# Patient Record
Sex: MALE | Race: OTHER | URBAN - METROPOLITAN AREA
[De-identification: names, ages, dates, MRNs, and addresses within clinical notes are randomized per-mention and may not be internally consistent; named-entity substitution may affect disease eponyms.]

---

## 2017-06-20 ENCOUNTER — EMERGENCY (EMERGENCY)
Facility: HOSPITAL | Age: 82
LOS: 1 days | Discharge: HOME | End: 2017-06-20
Attending: EMERGENCY MEDICINE | Admitting: EMERGENCY MEDICINE
Payer: MEDICARE

## 2017-06-20 VITALS
TEMPERATURE: 98 F | RESPIRATION RATE: 18 BRPM | DIASTOLIC BLOOD PRESSURE: 71 MMHG | HEART RATE: 90 BPM | OXYGEN SATURATION: 98 % | SYSTOLIC BLOOD PRESSURE: 127 MMHG

## 2017-06-20 DIAGNOSIS — K12.2 CELLULITIS AND ABSCESS OF MOUTH: ICD-10-CM

## 2017-06-20 LAB
ALBUMIN SERPL ELPH-MCNC: 4.1 G/DL — SIGNIFICANT CHANGE UP (ref 3.3–5)
ALP SERPL-CCNC: 73 U/L — SIGNIFICANT CHANGE UP (ref 40–120)
ALT FLD-CCNC: 13 U/L RC — SIGNIFICANT CHANGE UP (ref 10–45)
ANION GAP SERPL CALC-SCNC: 17 MMOL/L — SIGNIFICANT CHANGE UP (ref 5–17)
ANION GAP SERPL CALC-SCNC: 19 MMOL/L — HIGH (ref 5–17)
AST SERPL-CCNC: 27 U/L — SIGNIFICANT CHANGE UP (ref 10–40)
BASE EXCESS BLDV CALC-SCNC: -1 MMOL/L — SIGNIFICANT CHANGE UP (ref -2–2)
BASE EXCESS BLDV CALC-SCNC: -1.1 MMOL/L — SIGNIFICANT CHANGE UP (ref -2–2)
BASOPHILS # BLD AUTO: 0 K/UL — SIGNIFICANT CHANGE UP (ref 0–0.2)
BASOPHILS NFR BLD AUTO: 0.3 % — SIGNIFICANT CHANGE UP (ref 0–2)
BILIRUB SERPL-MCNC: 0.8 MG/DL — SIGNIFICANT CHANGE UP (ref 0.2–1.2)
BUN SERPL-MCNC: 26 MG/DL — HIGH (ref 7–23)
BUN SERPL-MCNC: 27 MG/DL — HIGH (ref 7–23)
CA-I SERPL-SCNC: 1.16 MMOL/L — SIGNIFICANT CHANGE UP (ref 1.12–1.3)
CA-I SERPL-SCNC: 1.27 MMOL/L — SIGNIFICANT CHANGE UP (ref 1.12–1.3)
CALCIUM SERPL-MCNC: 10 MG/DL — SIGNIFICANT CHANGE UP (ref 8.4–10.5)
CALCIUM SERPL-MCNC: 10.1 MG/DL — SIGNIFICANT CHANGE UP (ref 8.4–10.5)
CHLORIDE BLDV-SCNC: 110 MMOL/L — HIGH (ref 96–108)
CHLORIDE BLDV-SCNC: 111 MMOL/L — HIGH (ref 96–108)
CHLORIDE SERPL-SCNC: 102 MMOL/L — SIGNIFICANT CHANGE UP (ref 96–108)
CHLORIDE SERPL-SCNC: 102 MMOL/L — SIGNIFICANT CHANGE UP (ref 96–108)
CO2 BLDV-SCNC: 24 MMOL/L — SIGNIFICANT CHANGE UP (ref 22–30)
CO2 BLDV-SCNC: 25 MMOL/L — SIGNIFICANT CHANGE UP (ref 22–30)
CO2 SERPL-SCNC: 19 MMOL/L — LOW (ref 22–31)
CO2 SERPL-SCNC: 21 MMOL/L — LOW (ref 22–31)
CREAT SERPL-MCNC: 1.22 MG/DL — SIGNIFICANT CHANGE UP (ref 0.5–1.3)
CREAT SERPL-MCNC: 1.27 MG/DL — SIGNIFICANT CHANGE UP (ref 0.5–1.3)
EOSINOPHIL # BLD AUTO: 0 K/UL — SIGNIFICANT CHANGE UP (ref 0–0.5)
EOSINOPHIL NFR BLD AUTO: 0.3 % — SIGNIFICANT CHANGE UP (ref 0–6)
GAS PNL BLDV: 132 MMOL/L — LOW (ref 136–145)
GAS PNL BLDV: 137 MMOL/L — SIGNIFICANT CHANGE UP (ref 136–145)
GAS PNL BLDV: SIGNIFICANT CHANGE UP
GAS PNL BLDV: SIGNIFICANT CHANGE UP
GLUCOSE BLDV-MCNC: 142 MG/DL — HIGH (ref 70–99)
GLUCOSE BLDV-MCNC: 146 MG/DL — HIGH (ref 70–99)
GLUCOSE SERPL-MCNC: 139 MG/DL — HIGH (ref 70–99)
GLUCOSE SERPL-MCNC: 150 MG/DL — HIGH (ref 70–99)
HCO3 BLDV-SCNC: 23 MMOL/L — SIGNIFICANT CHANGE UP (ref 21–29)
HCO3 BLDV-SCNC: 24 MMOL/L — SIGNIFICANT CHANGE UP (ref 21–29)
HCT VFR BLD CALC: 44.3 % — SIGNIFICANT CHANGE UP (ref 39–50)
HCT VFR BLDA CALC: 47 % — SIGNIFICANT CHANGE UP (ref 39–50)
HCT VFR BLDA CALC: 47 % — SIGNIFICANT CHANGE UP (ref 39–50)
HGB BLD CALC-MCNC: 15.2 G/DL — SIGNIFICANT CHANGE UP (ref 13–17)
HGB BLD CALC-MCNC: 15.3 G/DL — SIGNIFICANT CHANGE UP (ref 13–17)
HGB BLD-MCNC: 15.1 G/DL — SIGNIFICANT CHANGE UP (ref 13–17)
LACTATE BLDV-MCNC: 0.9 MMOL/L — SIGNIFICANT CHANGE UP (ref 0.7–2)
LACTATE BLDV-MCNC: 1.2 MMOL/L — SIGNIFICANT CHANGE UP (ref 0.7–2)
LYMPHOCYTES # BLD AUTO: 1.1 K/UL — SIGNIFICANT CHANGE UP (ref 1–3.3)
LYMPHOCYTES # BLD AUTO: 6.7 % — LOW (ref 13–44)
MCHC RBC-ENTMCNC: 33.5 PG — SIGNIFICANT CHANGE UP (ref 27–34)
MCHC RBC-ENTMCNC: 34.2 GM/DL — SIGNIFICANT CHANGE UP (ref 32–36)
MCV RBC AUTO: 97.9 FL — SIGNIFICANT CHANGE UP (ref 80–100)
MONOCYTES # BLD AUTO: 0.3 K/UL — SIGNIFICANT CHANGE UP (ref 0–0.9)
MONOCYTES NFR BLD AUTO: 2.1 % — SIGNIFICANT CHANGE UP (ref 2–14)
NEUTROPHILS # BLD AUTO: 14.9 K/UL — HIGH (ref 1.8–7.4)
NEUTROPHILS NFR BLD AUTO: 90.6 % — HIGH (ref 43–77)
OTHER CELLS CSF MANUAL: 19 ML/DL — SIGNIFICANT CHANGE UP (ref 18–22)
PCO2 BLDV: 39 MMHG — SIGNIFICANT CHANGE UP (ref 35–50)
PCO2 BLDV: 43 MMHG — SIGNIFICANT CHANGE UP (ref 35–50)
PH BLDV: 7.36 — SIGNIFICANT CHANGE UP (ref 7.35–7.45)
PH BLDV: 7.39 — SIGNIFICANT CHANGE UP (ref 7.35–7.45)
PLATELET # BLD AUTO: 184 K/UL — SIGNIFICANT CHANGE UP (ref 150–400)
PO2 BLDV: 52 MMHG — HIGH (ref 25–45)
PO2 BLDV: 70 MMHG — HIGH (ref 25–45)
POTASSIUM BLDV-SCNC: 4.5 MMOL/L — SIGNIFICANT CHANGE UP (ref 3.5–5)
POTASSIUM BLDV-SCNC: 9.5 MMOL/L — CRITICAL HIGH (ref 3.5–5)
POTASSIUM SERPL-MCNC: 4.7 MMOL/L — SIGNIFICANT CHANGE UP (ref 3.5–5.3)
POTASSIUM SERPL-MCNC: 5.9 MMOL/L — HIGH (ref 3.5–5.3)
POTASSIUM SERPL-SCNC: 4.7 MMOL/L — SIGNIFICANT CHANGE UP (ref 3.5–5.3)
POTASSIUM SERPL-SCNC: 5.9 MMOL/L — HIGH (ref 3.5–5.3)
PROT SERPL-MCNC: 8.3 G/DL — SIGNIFICANT CHANGE UP (ref 6–8.3)
RBC # BLD: 4.52 M/UL — SIGNIFICANT CHANGE UP (ref 4.2–5.8)
RBC # FLD: 11.8 % — SIGNIFICANT CHANGE UP (ref 10.3–14.5)
S PYO AG SPEC QL IA: NEGATIVE — SIGNIFICANT CHANGE UP
SAO2 % BLDV: 86 % — SIGNIFICANT CHANGE UP (ref 67–88)
SAO2 % BLDV: 94 % — HIGH (ref 67–88)
SODIUM SERPL-SCNC: 140 MMOL/L — SIGNIFICANT CHANGE UP (ref 135–145)
SODIUM SERPL-SCNC: 140 MMOL/L — SIGNIFICANT CHANGE UP (ref 135–145)
WBC # BLD: 16.5 K/UL — HIGH (ref 3.8–10.5)
WBC # FLD AUTO: 16.5 K/UL — HIGH (ref 3.8–10.5)

## 2017-06-20 PROCEDURE — 70491 CT SOFT TISSUE NECK W/DYE: CPT | Mod: 26

## 2017-06-20 PROCEDURE — 99285 EMERGENCY DEPT VISIT HI MDM: CPT | Mod: 25

## 2017-06-20 RX ORDER — DEXAMETHASONE 0.5 MG/5ML
10 ELIXIR ORAL ONCE
Qty: 0 | Refills: 0 | Status: COMPLETED | OUTPATIENT
Start: 2017-06-20 | End: 2017-06-20

## 2017-06-20 RX ADMIN — Medication 100 MILLIGRAM(S): at 23:05

## 2017-06-20 RX ADMIN — Medication 102 MILLIGRAM(S): at 22:12

## 2017-06-20 NOTE — ED ADULT NURSE REASSESSMENT NOTE - NS ED NURSE REASSESS COMMENT FT1
Pt AAOx4, NAD, resp nonlabored, resting comfortably in bed with family at bedside. Pt c/o R throat pain 5/10. Airway is patent, no drooling, dyspnea, SOB noted. R tonsil is noted to be swollen, voice is still "garbled" and not baseline as per family at bedside, no changes observed. Pt denies headache, dizziness, chest pain, palpitations, SOB, abd pain, n/v/d, urinary symptoms, fevers, chills, weakness at this time. Pt awaiting ENT consult and CT results and dispo. Safety maintained.    0045: ED RN contacted MD Martin, ENT has not seen pt yet, as per MD Martin ENT saw pt's CT results and pt is to go to CDU, pt and family made aware of plan of care.  0100: Brenda CDU PA at bedside  0115: Report called to CDU RAPHAEL Calvin. Pt and family moved to CDU bed 5. 2330: Report received from [facility]. Pt AAOx4, NAD, resp nonlabored, resting comfortably in bed with family at bedside. Pt c/o R throat pain 5/10. Airway is patent, no drooling, dyspnea, SOB noted. R tonsil is noted to be swollen, voice is still "garbled" and not baseline as per family at bedside, no changes observed. Pt denies headache, dizziness, chest pain, palpitations, SOB, abd pain, n/v/d, urinary symptoms, fevers, chills, weakness at this time. Pt awaiting ENT consult and CT results and dispo. Safety maintained.    0045: ED RN contacted MD Martin, ENT has not seen pt yet, as per MD Martin ENT saw pt's CT results and pt is to go to CDU, pt and family made aware of plan of care.  0100: Brenda CDU PA at bedside  0115: Report called to CDU RAPHAEL Calvin. Pt and family moved to CDU bed 5.

## 2017-06-20 NOTE — ED PROVIDER NOTE - OBJECTIVE STATEMENT
80 yo male with PMHx of HTN and DM p/w sore throat.  The patient reports that he has had right sided ear pain and sore throat x 3 days.  Went to his PMD and was started on an unknown Abx.  Patient's symptoms continued to progress.  Now associated with muffled voice.  Went to urgent care today who sent the patient in to R/o PTA.  Denies fevers/chills, CP, SOB. abd pain, nvdc

## 2017-06-20 NOTE — ED ADULT NURSE NOTE - OBJECTIVE STATEMENT
82 y/o M, PMH HTN on meds, DM on Metformin, sent into ED from Urgent Care for r/o R tonsillar abscess. Pt reports 3 days of R ear and R throat pain, currently 9/10, pt saw PCP yesterday and was prescribed an antibiotic and "a solution to drink," pt reports worsening R throat pain/swelling, difficulty swallowing. Pt states "I have to force my voice," pt's family member states this is not his baseline voice, he sounds "garbled." Pt reports subjective fevers, has not taken any meds. Pt denies headache, dizziness, chest pain, palpitations, cough, SOB, abdominal pain, n/v/d, urinary symptoms, chills, weakness at this time. No drooling, dyspnea. Airway is patent but R tonsillar swelling is noted. ENT paged by PA to come see pt. Family member at bedside, safety maintained.

## 2017-06-20 NOTE — ED PROVIDER NOTE - PHYSICAL EXAMINATION
Gen: AAO x 3, NAD  Skin: No rashes or lesions  HEENT: NC/AT, PERRLA, EOMI, MMM.  RIght peritonsillar swelling.  Airway patent.  Resp: unlabored CTAB  Cardiac: rrr s1s2, no murmurs, rubs or gallops  GI: ND, +BS, Soft, NT  Ext: no pedal edema, FROM in all extremities  Neuro: no focal deficits

## 2017-06-20 NOTE — ED ADULT NURSE NOTE - CHPI ED SYMPTOMS NEG
no loss of consciousness/no fever/no syncope/no vomiting/no blurred vision/no nausea/no weakness/no change in level of consciousness/no chills/no numbness

## 2017-06-20 NOTE — ED PROVIDER NOTE - ATTENDING CONTRIBUTION TO CARE
Private Physician Lizzie Comer  [phone]  81y male PMH HTN, HLD, DM, pt comes to ed complains of sore throat and fever past 3 d seen by pmd and treated with abx . Now with worsening symtpms and muffled voice and pain on swallowing rt side PE WDWN male awake alert slight muffled voice no stridor, Neck supple mild rt ttp rt submental. Throat +swelling rt peritonsilar. cv no rubs, gallops or murmurs neuro no focal defects  James Sheriff MD, Facep

## 2017-06-20 NOTE — ED ADULT NURSE REASSESSMENT NOTE - GENERAL PATIENT STATE
smiling/interactive/comfortable appearance/cooperative/no change observed/resting/sleeping/family/SO at bedside

## 2017-06-20 NOTE — CONSULT NOTE ADULT - PROBLEM SELECTOR RECOMMENDATION 9
1)Recommend CT w/ cont. of neck to visualize tonsillar abscess. Will consider bedside I&D if present.  2) Pain control Recommend CDU observation  IV Clindamycin  IV Decadron  D/C with PO clindamycin Recommend CDU observation  Pain control  IV Clindamycin  IV Decadron  D/C with PO clindamycin

## 2017-06-20 NOTE — CONSULT NOTE ADULT - SUBJECTIVE AND OBJECTIVE BOX
CC: R/O PTA    HPI: 82 yo male with PMHx of HTN and DM p/w sore throat and right sided ear pain and sore throat x 3 days.  Went to his PMD and was started on an unknown Abx. Went to urgent care today who sent the patient in to R/O PTA.  Pt states his voice has changed and appeared slightly hoarse. Complains of pain upon swallowing liquids and solids. Denies any fevers, chills, neck stiffness, SOB, rhinorrhea, nasal congestion, discharge from ears or headaches. Pt is [place] speaking; translated by family at bedside.    PAST MEDICAL & SURGICAL HISTORY:  Diabetes  HTN (hypertension)  Hernia  No significant past surgical history    Allergies    No Known Allergies    Intolerances      MEDICATIONS  (STANDING):  clindamycin IVPB  IV Intermittent     MEDICATIONS  (PRN):    Social History: No history of tobacco use, EtOH use, illicit drugs    ROS: ENT, GI, , CV, Pulm, Neuro, Psych, MS, Heme, Endo, Constitutional all negative except as noted in HPI    Vital Signs Last 24 Hrs  T(C): 36.7, Max: 36.7 (06-20 @ 19:41)  T(F): 98.1, Max: 98.1 (06-20 @ 19:41)  HR: 90 (90 - 90)  BP: 127/71 (127/71 - 127/71)  BP(mean): --  RR: 18 (18 - 18)  SpO2: 98% (98% - 98%)                          15.1   16.5  )-----------( 184      ( 20 Jun 2017 21:20 )             44.3    06-20    140  |  102  |  27<H>  ----------------------------<  150<H>  4.7   |  19<L>  |  1.22    Ca    10.1      20 Jun 2017 22:03    TPro  8.3  /  Alb  4.1  /  TBili  0.8  /  DBili  x   /  AST  27  /  ALT  13  /  AlkPhos  73  06-20       PHYSICAL EXAM:  Gen: Awake and alert, sitting up, NAD, well-developed  Head: Normocephalic, Atraumatic  Face: no edema/erythema/fluctuance, parotid glands soft without mass  Eyes: PERRL, EOMI, no scleral injection  Ears: Right - ear canal clear, TM intact without effusion            Left - ear canal clear, TM intact without effusion  Nose: Nares bilaterally patent, no discharge  Mouth: Mucosa moist, tongue/uvula midline, oropharynx - Right peritonsillar edema and erythema.  Neck: Flat, supple, no lymphadenopathy, trachea midline, no masses  Resp: breathing easily, no stridor  CV: no peripheral edema/cyanosis CC: R/O PTA    HPI: 82 yo male with PMHx of HTN and DM p/w sore throat and right sided ear pain and sore throat x 3 days.  Went to his PMD and was started on an unknown Abx. Went to urgent care today who sent the patient in to R/O PTA.  Pt states his voice has changed and appeared slightly hoarse. Complains of pain upon swallowing liquids and solids. Denies any fevers, chills, neck stiffness, SOB, rhinorrhea, nasal congestion, discharge from ears or headaches. Pt is [place] speaking; translated by family at bedside.    PAST MEDICAL & SURGICAL HISTORY:  Diabetes  HTN (hypertension)  Hernia  No significant past surgical history    Allergies    No Known Allergies    Intolerances      MEDICATIONS  (STANDING):  clindamycin IVPB  IV Intermittent     MEDICATIONS  (PRN):    Social History: No history of tobacco use, EtOH use, illicit drugs    ROS: ENT, GI, , CV, Pulm, Neuro, Psych, MS, Heme, Endo, Constitutional all negative except as noted in HPI    Vital Signs Last 24 Hrs  T(C): 36.7, Max: 36.7 (06-20 @ 19:41)  T(F): 98.1, Max: 98.1 (06-20 @ 19:41)  HR: 90 (90 - 90)  BP: 127/71 (127/71 - 127/71)  BP(mean): --  RR: 18 (18 - 18)  SpO2: 98% (98% - 98%)                          15.1   16.5  )-----------( 184      ( 20 Jun 2017 21:20 )             44.3    06-20    140  |  102  |  27<H>  ----------------------------<  150<H>  4.7   |  19<L>  |  1.22    Ca    10.1      20 Jun 2017 22:03    TPro  8.3  /  Alb  4.1  /  TBili  0.8  /  DBili  x   /  AST  27  /  ALT  13  /  AlkPhos  73  06-20       PHYSICAL EXAM:  Gen: Awake and alert, sitting up, NAD, well-developed  Head: Normocephalic, Atraumatic  Face: no edema/erythema/fluctuance, parotid glands soft without mass  Eyes: PERRL, EOMI, no scleral injection  Ears: Right - ear canal clear, TM intact without effusion            Left - ear canal clear, TM intact without effusion  Nose: Nares bilaterally patent, no discharge  Mouth: Mucosa moist, tongue/uvula midline, oropharynx - Right peritonsillar edema and erythema.  Neck: Flat, supple, no lymphadenopathy, trachea midline, no masses  Resp: breathing easily, no stridor  CV: no peripheral edema/cyanosis    Imaging:  CT of neck- Asymmetric enlargement of the right palatine tonsil with a 1.0 x 0.8 x 1.6  cm peripherally enhancing collection within the tonsillar bed compatible  with an abscess. Asymmetric edema with a right pharyngeal wall extending to  the level of the piriform sinus. Mild thickening of the epiglottis. Reactive  adenopathy.    Enlarged thyroid gland with bilateral low-attenuation nodules, largest  measuring up to 2.5 cm and the left thyroid lobe. CC: R/O PTA    HPI: 82 yo male with PMHx of HTN and DM p/w sore throat and right sided ear pain and sore throat x 3 days.  Went to his PMD and was started on an unknown Abx. Went to urgent care today who sent the patient in to R/O PTA.  Pt states his voice has changed and appeared slightly hoarse. Complains of pain upon swallowing liquids and solids. Denies any fevers, chills, neck stiffness, SOB, rhinorrhea, nasal congestion, discharge from ears or headaches. Pt is Welsh speaking; translated by family at bedside.    PAST MEDICAL & SURGICAL HISTORY:  Diabetes  HTN (hypertension)  Hernia  No significant past surgical history    Allergies    No Known Allergies    Intolerances      MEDICATIONS  (STANDING):  clindamycin IVPB  IV Intermittent     MEDICATIONS  (PRN):    Social History: Current everyday smoker    ROS: ENT, GI, , CV, Pulm, Neuro, Psych, MS, Heme, Endo, Constitutional all negative except as noted in HPI    Vital Signs Last 24 Hrs  T(C): 36.7, Max: 36.7 (06-20 @ 19:41)  T(F): 98.1, Max: 98.1 (06-20 @ 19:41)  HR: 90 (90 - 90)  BP: 127/71 (127/71 - 127/71)  BP(mean): --  RR: 18 (18 - 18)  SpO2: 98% (98% - 98%)                          15.1   16.5  )-----------( 184      ( 20 Jun 2017 21:20 )             44.3    06-20    140  |  102  |  27<H>  ----------------------------<  150<H>  4.7   |  19<L>  |  1.22    Ca    10.1      20 Jun 2017 22:03    TPro  8.3  /  Alb  4.1  /  TBili  0.8  /  DBili  x   /  AST  27  /  ALT  13  /  AlkPhos  73  06-20       PHYSICAL EXAM:  Gen: Awake and alert, sitting up, NAD, well-developed  Head: Normocephalic, Atraumatic  Face: no edema/erythema/fluctuance, parotid glands soft without mass  Eyes: PERRL, EOMI, no scleral injection  Ears: Right - ear canal clear, TM intact without effusion            Left - ear canal clear, TM intact without effusion  Nose: Nares bilaterally patent, no discharge  Mouth: Mucosa moist, tongue/uvula midline, oropharynx - Right peritonsillar edema and erythema.  Neck: Flat, supple, no lymphadenopathy, trachea midline, no masses  Resp: breathing easily, no stridor  CV: no peripheral edema/cyanosis    Imaging:  CT of neck- Asymmetric enlargement of the right palatine tonsil with a 1.0 x 0.8 x 1.6  cm peripherally enhancing collection within the tonsillar bed compatible  with an abscess. Asymmetric edema with a right pharyngeal wall extending to  the level of the piriform sinus. Mild thickening of the epiglottis. Reactive  adenopathy.    Enlarged thyroid gland with bilateral low-attenuation nodules, largest  measuring up to 2.5 cm and the left thyroid lobe.

## 2017-06-20 NOTE — CONSULT NOTE ADULT - ASSESSMENT
82 y/o M w/ possible right PTA. 80 y/o M w/ right tonsillar edema.   CT of neck indicative of symmetric enlargement of the right palatine tonsil with a 1.0 x 0.8 x 1.6  cm peripherally enhancing collection within the tonsillar bed compatible  with an abscess. 82 y/o M w/ right tonsillar edema. WBC 16.5  CT of neck indicative of symmetric enlargement of the right palatine tonsil with a 1.0 x 0.8 x 1.6  cm peripherally enhancing collection within the tonsillar bed compatible  with an abscess.

## 2017-06-20 NOTE — ED PROVIDER NOTE - PROGRESS NOTE DETAILS
ENT consult called. -Ean Nino PA-C Endorsed To Dr Jose Antonio Sheriff MD, Facep Attending MD Martin: ENT has seen patient, they do not believe PTA is big enough to attempt drainage on, they recommend management with clindamycin, will observe in CDU overnight. will give fluids, recheck BMP in AM to ensure acidosis improved

## 2017-06-21 VITALS
OXYGEN SATURATION: 96 % | DIASTOLIC BLOOD PRESSURE: 75 MMHG | HEART RATE: 85 BPM | RESPIRATION RATE: 17 BRPM | TEMPERATURE: 98 F | SYSTOLIC BLOOD PRESSURE: 169 MMHG

## 2017-06-21 DIAGNOSIS — E04.1 NONTOXIC SINGLE THYROID NODULE: ICD-10-CM

## 2017-06-21 DIAGNOSIS — J35.1 HYPERTROPHY OF TONSILS: ICD-10-CM

## 2017-06-21 LAB
ANION GAP SERPL CALC-SCNC: 15 MMOL/L — SIGNIFICANT CHANGE UP (ref 5–17)
BASOPHILS # BLD AUTO: 0.1 K/UL — SIGNIFICANT CHANGE UP (ref 0–0.2)
BASOPHILS NFR BLD AUTO: 0.9 % — SIGNIFICANT CHANGE UP (ref 0–2)
BUN SERPL-MCNC: 28 MG/DL — HIGH (ref 7–23)
CALCIUM SERPL-MCNC: 9.9 MG/DL — SIGNIFICANT CHANGE UP (ref 8.4–10.5)
CHLORIDE SERPL-SCNC: 104 MMOL/L — SIGNIFICANT CHANGE UP (ref 96–108)
CO2 SERPL-SCNC: 21 MMOL/L — LOW (ref 22–31)
CREAT SERPL-MCNC: 1.08 MG/DL — SIGNIFICANT CHANGE UP (ref 0.5–1.3)
EOSINOPHIL # BLD AUTO: 0 K/UL — SIGNIFICANT CHANGE UP (ref 0–0.5)
EOSINOPHIL NFR BLD AUTO: 0.1 % — SIGNIFICANT CHANGE UP (ref 0–6)
GLUCOSE SERPL-MCNC: 164 MG/DL — HIGH (ref 70–99)
HCT VFR BLD CALC: 42.4 % — SIGNIFICANT CHANGE UP (ref 39–50)
HGB BLD-MCNC: 13.9 G/DL — SIGNIFICANT CHANGE UP (ref 13–17)
LYMPHOCYTES # BLD AUTO: 1.1 K/UL — SIGNIFICANT CHANGE UP (ref 1–3.3)
LYMPHOCYTES # BLD AUTO: 10.5 % — LOW (ref 13–44)
MCHC RBC-ENTMCNC: 32 PG — SIGNIFICANT CHANGE UP (ref 27–34)
MCHC RBC-ENTMCNC: 32.8 GM/DL — SIGNIFICANT CHANGE UP (ref 32–36)
MCV RBC AUTO: 97.4 FL — SIGNIFICANT CHANGE UP (ref 80–100)
MONOCYTES # BLD AUTO: 0.2 K/UL — SIGNIFICANT CHANGE UP (ref 0–0.9)
MONOCYTES NFR BLD AUTO: 2.2 % — SIGNIFICANT CHANGE UP (ref 2–14)
NEUTROPHILS # BLD AUTO: 9.4 K/UL — HIGH (ref 1.8–7.4)
NEUTROPHILS NFR BLD AUTO: 86.3 % — HIGH (ref 43–77)
PLATELET # BLD AUTO: 180 K/UL — SIGNIFICANT CHANGE UP (ref 150–400)
POTASSIUM SERPL-MCNC: 4.5 MMOL/L — SIGNIFICANT CHANGE UP (ref 3.5–5.3)
POTASSIUM SERPL-SCNC: 4.5 MMOL/L — SIGNIFICANT CHANGE UP (ref 3.5–5.3)
RBC # BLD: 4.35 M/UL — SIGNIFICANT CHANGE UP (ref 4.2–5.8)
RBC # FLD: 11.6 % — SIGNIFICANT CHANGE UP (ref 10.3–14.5)
SODIUM SERPL-SCNC: 140 MMOL/L — SIGNIFICANT CHANGE UP (ref 135–145)
WBC # BLD: 10.9 K/UL — HIGH (ref 3.8–10.5)
WBC # FLD AUTO: 10.9 K/UL — HIGH (ref 3.8–10.5)

## 2017-06-21 PROCEDURE — 82803 BLOOD GASES ANY COMBINATION: CPT

## 2017-06-21 PROCEDURE — 85014 HEMATOCRIT: CPT

## 2017-06-21 PROCEDURE — 82435 ASSAY OF BLOOD CHLORIDE: CPT

## 2017-06-21 PROCEDURE — 96376 TX/PRO/DX INJ SAME DRUG ADON: CPT | Mod: XU

## 2017-06-21 PROCEDURE — 80048 BASIC METABOLIC PNL TOTAL CA: CPT

## 2017-06-21 PROCEDURE — 96375 TX/PRO/DX INJ NEW DRUG ADDON: CPT | Mod: XU

## 2017-06-21 PROCEDURE — 82947 ASSAY GLUCOSE BLOOD QUANT: CPT

## 2017-06-21 PROCEDURE — 87880 STREP A ASSAY W/OPTIC: CPT

## 2017-06-21 PROCEDURE — 87081 CULTURE SCREEN ONLY: CPT

## 2017-06-21 PROCEDURE — 84132 ASSAY OF SERUM POTASSIUM: CPT

## 2017-06-21 PROCEDURE — 80053 COMPREHEN METABOLIC PANEL: CPT

## 2017-06-21 PROCEDURE — 70491 CT SOFT TISSUE NECK W/DYE: CPT

## 2017-06-21 PROCEDURE — 99236 HOSP IP/OBS SAME DATE HI 85: CPT

## 2017-06-21 PROCEDURE — 82330 ASSAY OF CALCIUM: CPT

## 2017-06-21 PROCEDURE — G0378: CPT

## 2017-06-21 PROCEDURE — 83605 ASSAY OF LACTIC ACID: CPT

## 2017-06-21 PROCEDURE — 85027 COMPLETE CBC AUTOMATED: CPT

## 2017-06-21 PROCEDURE — 99284 EMERGENCY DEPT VISIT MOD MDM: CPT | Mod: 25

## 2017-06-21 PROCEDURE — 84295 ASSAY OF SERUM SODIUM: CPT

## 2017-06-21 PROCEDURE — 96374 THER/PROPH/DIAG INJ IV PUSH: CPT | Mod: XU

## 2017-06-21 RX ORDER — ACETAMINOPHEN 500 MG
650 TABLET ORAL ONCE
Qty: 0 | Refills: 0 | Status: COMPLETED | OUTPATIENT
Start: 2017-06-21 | End: 2017-06-21

## 2017-06-21 RX ORDER — DEXAMETHASONE 0.5 MG/5ML
10 ELIXIR ORAL EVERY 8 HOURS
Qty: 0 | Refills: 0 | Status: DISCONTINUED | OUTPATIENT
Start: 2017-06-21 | End: 2017-06-24

## 2017-06-21 RX ORDER — FAMOTIDINE 10 MG/ML
20 INJECTION INTRAVENOUS DAILY
Qty: 0 | Refills: 0 | Status: DISCONTINUED | OUTPATIENT
Start: 2017-06-21 | End: 2017-06-24

## 2017-06-21 RX ORDER — ASPIRIN/CALCIUM CARB/MAGNESIUM 324 MG
81 TABLET ORAL DAILY
Qty: 0 | Refills: 0 | Status: DISCONTINUED | OUTPATIENT
Start: 2017-06-21 | End: 2017-06-24

## 2017-06-21 RX ORDER — ACETAMINOPHEN 500 MG
1000 TABLET ORAL ONCE
Qty: 0 | Refills: 0 | Status: COMPLETED | OUTPATIENT
Start: 2017-06-21 | End: 2017-06-21

## 2017-06-21 RX ORDER — TAMSULOSIN HYDROCHLORIDE 0.4 MG/1
0.4 CAPSULE ORAL AT BEDTIME
Qty: 0 | Refills: 0 | Status: DISCONTINUED | OUTPATIENT
Start: 2017-06-21 | End: 2017-06-24

## 2017-06-21 RX ORDER — AMLODIPINE BESYLATE 2.5 MG/1
7.5 TABLET ORAL DAILY
Qty: 0 | Refills: 0 | Status: DISCONTINUED | OUTPATIENT
Start: 2017-06-21 | End: 2017-06-24

## 2017-06-21 RX ORDER — METFORMIN HYDROCHLORIDE 850 MG/1
500 TABLET ORAL DAILY
Qty: 0 | Refills: 0 | Status: DISCONTINUED | OUTPATIENT
Start: 2017-06-21 | End: 2017-06-24

## 2017-06-21 RX ORDER — SODIUM CHLORIDE 9 MG/ML
1000 INJECTION, SOLUTION INTRAVENOUS ONCE
Qty: 0 | Refills: 0 | Status: COMPLETED | OUTPATIENT
Start: 2017-06-21 | End: 2017-06-21

## 2017-06-21 RX ORDER — ATORVASTATIN CALCIUM 80 MG/1
20 TABLET, FILM COATED ORAL AT BEDTIME
Qty: 0 | Refills: 0 | Status: DISCONTINUED | OUTPATIENT
Start: 2017-06-21 | End: 2017-06-24

## 2017-06-21 RX ORDER — VALSARTAN 80 MG/1
320 TABLET ORAL DAILY
Qty: 0 | Refills: 0 | Status: DISCONTINUED | OUTPATIENT
Start: 2017-06-21 | End: 2017-06-24

## 2017-06-21 RX ADMIN — Medication 400 MILLIGRAM(S): at 01:55

## 2017-06-21 RX ADMIN — SODIUM CHLORIDE 4000 MILLILITER(S): 9 INJECTION, SOLUTION INTRAVENOUS at 00:50

## 2017-06-21 RX ADMIN — Medication 650 MILLIGRAM(S): at 07:58

## 2017-06-21 RX ADMIN — Medication 102 MILLIGRAM(S): at 06:19

## 2017-06-21 RX ADMIN — Medication 100 MILLIGRAM(S): at 07:47

## 2017-06-21 NOTE — ED CDU PROVIDER NOTE - PROGRESS NOTE DETAILS
CDU NOTE SRINIVASAN Calle: pt resting comfortably, feels improved. NAD VSS. pharynx- +uvula edema and R tonsillarmegaly noted. no exudates. pt resting, stable, feeling much better, son translating VIA phone, will repeat blood work and hopefully dc mid day today, will continue to monitor PA Farrah Caruso I have personally performed a face to face diagnostic evaluation on this patient.  I have reviewed the ACP note and agree with the history, exam, and plan of care, except as noted.  History and Exam by me shows  Attn -  phone used- pt feeling much better with less pain and able to have breakfast and swallow without difficulty.  no fever.  pt advised to take Abx Clindamycin for 10 days and decadron and f/u with ENT in the next week and return to ER if develops worsening pain or fever.  Pt stable for D/C.

## 2017-06-21 NOTE — ED CDU PROVIDER NOTE - PLAN OF CARE
1. Stay hydrated. Salt water gargles frequently. STOP Smoking.  2. Continue current home medications  3. Take clindamycin 300mg 4x/day for 10 days.  Start probiotic as instructed.  4. Follow up with your Primary Care PA Lizzie Comer and ENT in 1-2 days (Bring Printed results to your doctor visit)  5. Return if symptoms worsen, fever, weakness, spreading redness and all other concerns 1. Stay hydrated. Salt water gargles frequently. STOP Smoking.  2. Continue current home medications  3. Take clindamycin 300mg 4x/day for 10 days.  Start probiotic as instructed.  4. Follow up with your Primary Care PA Lizzie Comer (and follow up Thyroid nodules) and ENT Dr. Riggs [phone] in 1-2 days (Bring Printed results to your doctor visit)  5. Return if symptoms worsen, fever, weakness, spreading redness and all other concerns 1. Stay hydrated. Salt water gargles frequently. STOP Smoking.  2. Continue current home medications  3. Take clindamycin 300mg 4x/day for 10 days.  Start probiotic as instructed.  4. Follow up with your Primary Care SRINIVASAN Comer (and follow up Thyroid nodules) in 1-2 days and ENT Dr. Rivera #[phone] in 2-3 days. Can follow up Thyroid nodule with [facility] ENT [phone] in 3-4 days. (Bring Printed results to your doctor visit)  5. Return if symptoms worsen, fever, weakness, spreading redness and all other concerns

## 2017-06-21 NOTE — ED CDU PROVIDER NOTE - ATTENDING CONTRIBUTION TO CARE
Attending MD Martin:   I personally have seen and examined this patient.  Physician assistant note reviewed and agree on plan of care and except where noted.  See HPI, PE, and MDM for details.

## 2017-06-21 NOTE — ED CDU PROVIDER NOTE - OBJECTIVE STATEMENT
80 yo male with PMHx of HTN and DM p/w sore throat.  The patient reports that he has had right sided ear pain and sore throat x 3 days.  Went to his PMD and was started on an unknown Abx.  Patient's symptoms continued to progress.  Now associated with muffled voice.  Went to urgent care today who sent the patient in to R/o PTA.  Denies fevers/chills, CP, SOB. abd pain, nvdc 80 yo male with PMHx of HTN and pre-DM p/w sore throat.  The patient reports that he has had right sided ear pain and sore throat, worsening x 3 days.  Went to his PMD and was started on Augmentin and Promethazine, took since yesterday but Patient's symptoms continued to progress.  Now associated with muffled voice.  Went to urgent care today who sent the patient in to R/o PTA.  mild N today. able to eat only a small piece of boiled apple but it was painful and difficult, so has avoided eating.   +fever of 100.4 at urgent care today.  denies CP, SOB. abd pain, V/D, drooling

## 2017-06-21 NOTE — ED ADULT NURSE REASSESSMENT NOTE - NS ED NURSE REASSESS COMMENT FT1
Pt report received from [facility]. Pt transferred to cdu for observation for throat pain related to peritonsillar abscess. Pt Luxem[place]ish speaking, family at bedside to interpret. Pt a/ox3, voice is garbled, denies dyspnea, sob, difficulty swallowing. Pt O2 sat 98% on room air. Pt c/o 7/10 throat pain improved from 9/10. Awaiting IV clindamycin 600mg. Pt VSS, safety maintained, will continue to monitor and assess.

## 2017-06-22 ENCOUNTER — APPOINTMENT (OUTPATIENT)
Dept: SURGERY | Facility: CLINIC | Age: 82
End: 2017-06-22

## 2017-07-17 ENCOUNTER — APPOINTMENT (OUTPATIENT)
Dept: SURGERY | Facility: CLINIC | Age: 82
End: 2017-07-17

## 2017-07-17 VITALS
BODY MASS INDEX: 24.27 KG/M2 | HEIGHT: 66 IN | TEMPERATURE: 98.2 F | WEIGHT: 151 LBS | DIASTOLIC BLOOD PRESSURE: 78 MMHG | SYSTOLIC BLOOD PRESSURE: 116 MMHG

## 2017-07-17 DIAGNOSIS — Z87.19 PERSONAL HISTORY OF OTHER DISEASES OF THE DIGESTIVE SYSTEM: ICD-10-CM

## 2017-07-17 DIAGNOSIS — K40.90 UNILATERAL INGUINAL HERNIA, W/OUT OBSTRUCTION OR GANGRENE, NOT SPECIFIED AS RECURRENT: ICD-10-CM

## 2017-07-27 ENCOUNTER — OUTPATIENT (OUTPATIENT)
Dept: OUTPATIENT SERVICES | Facility: HOSPITAL | Age: 82
LOS: 1 days | End: 2017-07-27
Payer: MEDICARE

## 2017-07-27 VITALS
DIASTOLIC BLOOD PRESSURE: 67 MMHG | HEIGHT: 66 IN | WEIGHT: 154.98 LBS | OXYGEN SATURATION: 97 % | TEMPERATURE: 98 F | SYSTOLIC BLOOD PRESSURE: 114 MMHG | HEART RATE: 66 BPM | RESPIRATION RATE: 15 BRPM

## 2017-07-27 DIAGNOSIS — Z86.79 PERSONAL HISTORY OF OTHER DISEASES OF THE CIRCULATORY SYSTEM: ICD-10-CM

## 2017-07-27 DIAGNOSIS — Z01.818 ENCOUNTER FOR OTHER PREPROCEDURAL EXAMINATION: ICD-10-CM

## 2017-07-27 DIAGNOSIS — Z98.890 OTHER SPECIFIED POSTPROCEDURAL STATES: Chronic | ICD-10-CM

## 2017-07-27 DIAGNOSIS — K40.90 UNILATERAL INGUINAL HERNIA, WITHOUT OBSTRUCTION OR GANGRENE, NOT SPECIFIED AS RECURRENT: ICD-10-CM

## 2017-07-27 DIAGNOSIS — Z90.49 ACQUIRED ABSENCE OF OTHER SPECIFIED PARTS OF DIGESTIVE TRACT: Chronic | ICD-10-CM

## 2017-07-27 DIAGNOSIS — K46.9 UNSPECIFIED ABDOMINAL HERNIA WITHOUT OBSTRUCTION OR GANGRENE: ICD-10-CM

## 2017-07-27 PROCEDURE — G0463: CPT

## 2017-07-27 RX ORDER — SODIUM CHLORIDE 9 MG/ML
3 INJECTION INTRAMUSCULAR; INTRAVENOUS; SUBCUTANEOUS EVERY 8 HOURS
Qty: 0 | Refills: 0 | Status: DISCONTINUED | OUTPATIENT
Start: 2017-07-28 | End: 2017-08-05

## 2017-07-27 NOTE — H&P PST ADULT - RS GEN PE MLT RESP DETAILS PC
airway patent/respirations non-labored/clear to auscultation bilaterally/good air movement/normal/breath sounds equal

## 2017-07-27 NOTE — H&P PST ADULT - PMH
DM (diabetes mellitus)    GERD (gastroesophageal reflux disease)    Hernia  left inguinal  History of hypertension    HLD (hyperlipidemia)

## 2017-07-27 NOTE — H&P PST ADULT - NSANTHOSAYNRD_GEN_A_CORE
No. NINA screening performed.  STOP BANG Legend: 0-2 = LOW Risk; 3-4 = INTERMEDIATE Risk; 5-8 = HIGH Risk

## 2017-07-27 NOTE — H&P PST ADULT - FAMILY HISTORY
Father  Still living? No  Myocardial infarction, Age at diagnosis: Age Unknown     Mother  Still living? No  Family history of tuberculosis, Age at diagnosis: Age Unknown

## 2017-07-28 ENCOUNTER — OUTPATIENT (OUTPATIENT)
Dept: OUTPATIENT SERVICES | Facility: HOSPITAL | Age: 82
LOS: 1 days | Discharge: HOME | End: 2017-07-28
Payer: MEDICARE

## 2017-07-28 ENCOUNTER — TRANSCRIPTION ENCOUNTER (OUTPATIENT)
Age: 82
End: 2017-07-28

## 2017-07-28 VITALS
OXYGEN SATURATION: 96 % | HEART RATE: 69 BPM | WEIGHT: 154.98 LBS | SYSTOLIC BLOOD PRESSURE: 134 MMHG | RESPIRATION RATE: 14 BRPM | HEIGHT: 66 IN | TEMPERATURE: 98 F | DIASTOLIC BLOOD PRESSURE: 62 MMHG

## 2017-07-28 VITALS
HEART RATE: 57 BPM | RESPIRATION RATE: 14 BRPM | DIASTOLIC BLOOD PRESSURE: 66 MMHG | OXYGEN SATURATION: 98 % | SYSTOLIC BLOOD PRESSURE: 132 MMHG | TEMPERATURE: 98 F

## 2017-07-28 DIAGNOSIS — K40.90 UNILATERAL INGUINAL HERNIA, WITHOUT OBSTRUCTION OR GANGRENE, NOT SPECIFIED AS RECURRENT: ICD-10-CM

## 2017-07-28 DIAGNOSIS — Z90.49 ACQUIRED ABSENCE OF OTHER SPECIFIED PARTS OF DIGESTIVE TRACT: Chronic | ICD-10-CM

## 2017-07-28 DIAGNOSIS — Z98.890 OTHER SPECIFIED POSTPROCEDURAL STATES: Chronic | ICD-10-CM

## 2017-07-28 PROCEDURE — 49505 PRP I/HERN INIT REDUC >5 YR: CPT | Mod: AS,LT

## 2017-07-28 PROCEDURE — C1781: CPT

## 2017-07-28 PROCEDURE — 49505 PRP I/HERN INIT REDUC >5 YR: CPT | Mod: LT

## 2017-07-28 RX ORDER — SODIUM CHLORIDE 9 MG/ML
1000 INJECTION, SOLUTION INTRAVENOUS
Qty: 0 | Refills: 0 | Status: DISCONTINUED | OUTPATIENT
Start: 2017-07-28 | End: 2017-08-05

## 2017-07-28 RX ORDER — TAMSULOSIN HYDROCHLORIDE 0.4 MG/1
1 CAPSULE ORAL
Qty: 0 | Refills: 0 | COMMUNITY

## 2017-07-28 RX ORDER — RANITIDINE HYDROCHLORIDE 150 MG/1
0 TABLET, FILM COATED ORAL
Qty: 0 | Refills: 0 | COMMUNITY

## 2017-07-28 RX ORDER — ACETAMINOPHEN 500 MG
1000 TABLET ORAL ONCE
Qty: 0 | Refills: 0 | Status: DISCONTINUED | OUTPATIENT
Start: 2017-07-28 | End: 2017-07-28

## 2017-07-28 RX ORDER — OXYCODONE AND ACETAMINOPHEN 5; 325 MG/1; MG/1
1 TABLET ORAL EVERY 4 HOURS
Qty: 0 | Refills: 0 | Status: DISCONTINUED | OUTPATIENT
Start: 2017-07-28 | End: 2017-07-28

## 2017-07-28 RX ORDER — METFORMIN HYDROCHLORIDE 850 MG/1
1 TABLET ORAL
Qty: 0 | Refills: 0 | COMMUNITY

## 2017-07-28 RX ORDER — AMLODIPINE BESYLATE 2.5 MG/1
1 TABLET ORAL
Qty: 0 | Refills: 0 | COMMUNITY

## 2017-07-28 RX ORDER — SODIUM CHLORIDE 9 MG/ML
1000 INJECTION, SOLUTION INTRAVENOUS
Qty: 0 | Refills: 0 | Status: DISCONTINUED | OUTPATIENT
Start: 2017-07-28 | End: 2017-07-28

## 2017-07-28 RX ORDER — FENTANYL CITRATE 50 UG/ML
25 INJECTION INTRAVENOUS
Qty: 0 | Refills: 0 | Status: DISCONTINUED | OUTPATIENT
Start: 2017-07-28 | End: 2017-07-28

## 2017-07-28 RX ORDER — OXYCODONE HYDROCHLORIDE 5 MG/1
1 TABLET ORAL
Qty: 12 | Refills: 0 | OUTPATIENT
Start: 2017-07-28 | End: 2017-07-31

## 2017-07-28 RX ADMIN — SODIUM CHLORIDE 3 MILLILITER(S): 9 INJECTION INTRAMUSCULAR; INTRAVENOUS; SUBCUTANEOUS at 06:32

## 2017-07-28 NOTE — ASU DISCHARGE PLAN (ADULT/PEDIATRIC). - MEDICATION SUMMARY - MEDICATIONS TO TAKE
I will START or STAY ON the medications listed below when I get home from the hospital:    acetaminophen-oxycodone 325 mg-5 mg oral tablet  -- 1 tab(s) by mouth every 6 hours, As Needed, Moderate Pain (4 - 6) MDD:4  -- Indication: For pain     tamsulosin 0.4 mg oral capsule  -- 1 cap(s) by mouth once a day  -- Indication: For as directed     metFORMIN 500 mg oral tablet, extended release  -- 1 tab(s) by mouth once a day  -- Indication: For as directed     pravastatin 80 mg oral tablet  -- 1 tab(s) by mouth once a day  -- Indication: For as directed     Norvasc 2.5 mg oral tablet  -- 1 tab(s) by mouth once a day  -- Indication: For as directed     raNITIdine 150 mg oral tablet  --  by mouth once a day  -- Indication: For as directed

## 2017-08-02 DIAGNOSIS — E78.5 HYPERLIPIDEMIA, UNSPECIFIED: ICD-10-CM

## 2017-08-02 DIAGNOSIS — N40.0 BENIGN PROSTATIC HYPERPLASIA WITHOUT LOWER URINARY TRACT SYMPTOMS: ICD-10-CM

## 2017-08-02 DIAGNOSIS — K21.9 GASTRO-ESOPHAGEAL REFLUX DISEASE WITHOUT ESOPHAGITIS: ICD-10-CM

## 2017-08-02 DIAGNOSIS — I10 ESSENTIAL (PRIMARY) HYPERTENSION: ICD-10-CM

## 2017-08-02 DIAGNOSIS — F17.210 NICOTINE DEPENDENCE, CIGARETTES, UNCOMPLICATED: ICD-10-CM

## 2017-08-02 DIAGNOSIS — E11.9 TYPE 2 DIABETES MELLITUS WITHOUT COMPLICATIONS: ICD-10-CM

## 2017-08-02 DIAGNOSIS — K40.90 UNILATERAL INGUINAL HERNIA, WITHOUT OBSTRUCTION OR GANGRENE, NOT SPECIFIED AS RECURRENT: ICD-10-CM

## 2017-08-09 PROBLEM — Z87.19 HISTORY OF INGUINAL HERNIA: Status: RESOLVED | Noted: 2017-08-09 | Resolved: 2017-08-09

## 2017-08-09 PROBLEM — K40.90 LEFT INGUINAL HERNIA: Status: ACTIVE | Noted: 2017-08-09

## 2017-08-17 ENCOUNTER — APPOINTMENT (OUTPATIENT)
Dept: SURGERY | Facility: CLINIC | Age: 82
End: 2017-08-17

## 2017-08-17 VITALS
HEART RATE: 71 BPM | TEMPERATURE: 98.2 F | WEIGHT: 151.5 LBS | OXYGEN SATURATION: 98 % | SYSTOLIC BLOOD PRESSURE: 112 MMHG | DIASTOLIC BLOOD PRESSURE: 67 MMHG | BODY MASS INDEX: 24.35 KG/M2 | HEIGHT: 66 IN

## 2017-10-30 RX ORDER — METFORMIN HYDROCHLORIDE 850 MG/1
0 TABLET ORAL
Qty: 0 | Refills: 0 | COMMUNITY

## 2017-10-30 RX ORDER — AMLODIPINE BESYLATE 2.5 MG/1
0 TABLET ORAL
Qty: 0 | Refills: 0 | COMMUNITY

## 2017-10-30 RX ORDER — RANITIDINE HYDROCHLORIDE 150 MG/1
1 TABLET, FILM COATED ORAL
Qty: 0 | Refills: 0 | COMMUNITY

## 2017-10-30 RX ORDER — TAMSULOSIN HYDROCHLORIDE 0.4 MG/1
1 CAPSULE ORAL
Qty: 0 | Refills: 0 | COMMUNITY

## 2017-10-30 RX ORDER — AMLODIPINE BESYLATE 2.5 MG/1
7.5 TABLET ORAL
Qty: 0 | Refills: 0 | COMMUNITY

## 2017-10-30 RX ORDER — RANITIDINE HYDROCHLORIDE 150 MG/1
0 TABLET, FILM COATED ORAL
Qty: 0 | Refills: 0 | COMMUNITY

## 2017-10-30 RX ORDER — AMOXICILLIN 250 MG/5ML
0 SUSPENSION, RECONSTITUTED, ORAL (ML) ORAL
Qty: 0 | Refills: 0 | COMMUNITY

## 2017-10-30 RX ORDER — VALSARTAN 80 MG/1
1 TABLET ORAL
Qty: 0 | Refills: 0 | COMMUNITY

## 2017-10-30 RX ORDER — VALSARTAN 80 MG/1
0 TABLET ORAL
Qty: 0 | Refills: 0 | COMMUNITY

## 2017-10-30 RX ORDER — TAMSULOSIN HYDROCHLORIDE 0.4 MG/1
0 CAPSULE ORAL
Qty: 0 | Refills: 0 | COMMUNITY

## 2017-10-30 RX ORDER — METFORMIN HYDROCHLORIDE 850 MG/1
1 TABLET ORAL
Qty: 0 | Refills: 0 | COMMUNITY

## 2020-03-09 NOTE — ED ADULT NURSE NOTE - CAS TRG GENERAL AIRWAY, MLM
[facility] ICU Progress Note      Admit Date: 3/8/2020  Resident(s): Trinidad Rodriguez M.D.  Attending: SAILAJA Gamboa/ Dr. Cota    Date & Time:   3/9/2020   10:18 AM       Patient ID:    Name:             Harvinder Gabriel   YOB: 1977  Age:                 42 y.o.  male   MRN:               [id number]    Diagnosis:  Sepsis  Acute respiratory failure  Community-acquired pneumonia  Bacteremia    ID:  42-year-old homeless man with past medical history of methamphetamine use presented with 2-day history of progressive shortness of breath in setting of acute respiratory failure secondary to community-acquired pneumonia.    Interval Events:  -No acute events overnight.  Patient often uncooperative, endorses headache and stiff neck when asked, but exam benign, with no nuchal rigidity and negative Kernig's and Burzynski signs, alert and oriented x4, afebrile since admission.  Seen resting comfortably when not disturbed.  Patient also adamantly declined lumbar puncture when discussed, especially given likely strep species growing on blood cultures, patient understands risk of missing meningitis and possible death by foregoing lumbar puncture.  Given patient's strong preference, no evidence of meningitis on exam, and likelihood of CSF sterilization by antibiotics (initiated 3/8/2020 morning), will not pursue lumbar puncture at this time.  - Lactic trended down with fluids and antibiotics, now below 4.  - Remains tachycardic, sinus tachycardia on monitoring, likely secondary to methamphetamine intoxication, as last use shortly prior to presentation to ED 3/8/2020.  Patient denies alcohol or other drug use.  -Transfer to telemetry.    Review of Systems   Constitutional: Negative for chills and fever.   HENT: Negative for sinus pain and sore throat.    Eyes: Negative for pain and discharge.   Respiratory: Positive for cough and shortness of breath. Negative for hemoptysis.    Cardiovascular: Negative for chest pain,  palpitations and leg swelling.   Gastrointestinal: Negative for abdominal pain, nausea and vomiting.   Genitourinary: Negative for dysuria and hematuria.   Musculoskeletal: Positive for neck pain. Negative for myalgias.   Skin: Negative for itching and rash.   Neurological: Positive for headaches. Negative for dizziness.   Psychiatric/Behavioral: Positive for substance abuse. Negative for hallucinations.       VITALS:  Pulse: (!) 111  Blood Pressure: 112/59       Temp  Av.6 °C (97.9 °F)  Min: 35.9 °C (96.6 °F)  Max: 37.1 °C (98.8 °F)         Physical Exam:  Physical Exam   Constitutional: He is oriented to person, place, and time.   Irritable, but seen resting comfortably when not disturbed.  In no acute distress.   HENT:   Head: Normocephalic and atraumatic.   Eyes: Conjunctivae and EOM are normal.   Neck: Normal range of motion. Neck supple.   No nuchal rigidity.   Cardiovascular: Regular rhythm. Exam reveals no gallop and no friction rub.   No murmur heard.  Mildly tachycardic.   Pulmonary/Chest: Effort normal. No respiratory distress. He has no wheezes. He has no rales.   Somewhat diminished breath sounds, but no significant wheezing, rhonchi, crackles.   Abdominal: Soft. There is no abdominal tenderness. There is no rebound and no guarding.   Musculoskeletal: Normal range of motion.         General: No tenderness or edema.   Neurological: He is alert and oriented to person, place, and time. No cranial nerve deficit.   Brudzinski and Kernig signs negative.   Skin: Skin is warm and dry. No rash noted.   No Janeway lesions, Osler nodes   Psychiatric:   Irritable, often not cooperative, but no overt aggression.          Consultants:  Critical care    Procedures:  N/A    HemoDynamics:  Pulse: (!) 111 Blood Pressure: 112/59      Respiratory:     Respiration: 18, Pulse Oximetry: 99 %    Chest Tube Drains:        Neuro:    Fluids:  Intake/Output       20 0700 - 20 0659 (Not Admitted) 20 0700 -  20 0659 20 07 - 03/10/20 0659      1900-0659 Total  Total  Total       Intake    P.O.  --  -- --  480  1930 2410  250  -- 250    P.O. -- -- -- 480 1930 2410 250 -- 250    I.V.  --  -- --  293.8  1500 1793.8  125  -- 125    Volume (mL) (lactated ringers infusion) -- -- -- 293.8 1500 1793.8 125 -- 125    IV Piggyback  --  -- --  2753.3  113.3 2866.7  --  -- --    Volume (mL) (lactated ringers infusion (BOLUS)) -- -- -- 2500 -- 2500 -- -- --    Volume (mL) (cefTRIAXone (ROCEPHIN) 2 g in  mL IVPB) -- -- -- 100 113.3 213.3 -- -- --    Volume (mL) (ampicillin/sulbactam (UNASYN) 3 g in  mL IVPB) -- -- -- 153.3 -- 153.3 -- -- --    Total Intake -- -- -- 3527.1 3543.3 7070.4 375 -- 375       Output    Urine  --  -- --  800  1700 2500  625  -- 625    Number of Times Voided -- -- -- 1 x 4 x 5 x 1 x -- 1 x    Urine Void (mL) -- -- -- 800 1700 2500 625 -- 625    Stool  --  -- --  --  -- --  --  -- --    Number of Times Stooled -- -- -- -- -- -- 0 x -- 0 x    Total Output -- -- -- 800 1700 2500 625 -- 625       Net I/O     -- -- -- 2727.1 1843.3 4570.4 -250 -- -250           Recent Labs     20  1010 20  0405   SODIUM 130* 133*   POTASSIUM 3.9 4.7   CHLORIDE 95* 103   CO2 21 23   BUN 15 12   CREATININE 1.09 0.78   MAGNESIUM  --  1.9   CALCIUM 9.6 8.9     Body mass index is 20.92 kg/m².    GI/Nutrition:  Recent Labs     20  1010 20  0405   ALTSGPT 21 17   ASTSGOT 26 24   ALKPHOSPHAT 93 77   TBILIRUBIN 1.5 0.6   GLUCOSE 159* 125*       Heme:  Recent Labs     20  1010 20  0405   RBC 5.73 5.23   HEMOGLOBIN 17.6 15.8   HEMATOCRIT 51.8 46.2   PLATELETCT 373 290       Infectious Disease:  Temp  Av.6 °C (97.9 °F)  Min: 35.9 °C (96.6 °F)  Max: 37.1 °C (98.8 °F)  Recent Labs     20  1010 20  0405   WBC 24.7* 24.0*   NEUTSPOLYS 96.40* 69.90   LYMPHOCYTES 1.80* 3.50*   MONOCYTES 0.90 1.80   EOSINOPHILS 0.00 0.00  "  BASOPHILS 0.40 0.90   ASTSGOT 26 24   ALTSGPT 21 17   ALKPHOSPHAT 93 77   TBILIRUBIN 1.5 0.6       Medications:  • magnesium sulfate  1 g     • [START ON 3/10/2020] cefTRIAXone (ROCEPHIN) IV  2 g     • senna-docusate  2 Tab      And   • polyethylene glycol/lytes  1 Packet      And   • magnesium hydroxide  30 mL      And   • bisacodyl  10 mg     • Respiratory Therapy Consult       • enoxaparin  40 mg     • acetaminophen  650 mg          Imaging:  DX-CHEST-PORTABLE (1 VIEW)   Final Result      Consolidation within the right lung base.      CT-CHEST (THORAX) W/O    (Results Pending)       Assessment and plan    * Sepsis (HCC)- (present on admission)  Assessment & Plan  This is Severe Sepsis Present on admission  SIRS criteria identified on my evaluation include: Tachycardia, with heart rate greater than 90 BPM, Tachypnea, with respirations greater than 20 per minute and Leukocyosis, with WBC greater than 12,000  Source of infection is RLL pneumonia  Clinical indicators of end organ dysfunction include Lactate >2 mmol/L (18.0 mg/dL)  Sepsis protocol initiated  Fluid resuscitation ordered per protocol  IV antibiotics as appropriate for source of sepsis  Reassessment: I have reassessed the patient's hemodynamic status  -Improved with antibiotics, fluids-tachycardia likely has component of methamphetamine intoxication as well.  - See \"RLL pneumonia\" for details.        Bacteremia- (present on admission)  Assessment & Plan  -3/8/2020 Bcx growing likely Strep species on preliminary results, likely secondary to Strep pneumonia.  -Patient endorses headache and stiff neck when asked, but exam benign, with no nuchal rigidity and negative Kernig's and Brudzinski's signs, alert and oriented x4, afebrile since admission.  Seen resting comfortably when not disturbed.  Patient also adamantly declined lumbar puncture when discussed, especially given likely strep species growing on blood cultures, patient understands risk of missing " "meningitis and possible death by foregoing lumbar puncture.  Given patient's strong preference, no evidence of meningitis on exam, and likelihood of CSF sterilization by antibiotics (initiated 3/8/2020 morning), will not pursue lumbar puncture or add vancomycin or dexamethasone at this time.  -No evidence of endocarditis, defer echo at this time.  -10-day course of ceftriaxone (3/8/2020-3/17/2020), pending susceptibilities.  - Repeat blood cultures 3/10/2020.  -Pending HIV test.    Lactic acidosis- (present on admission)  Assessment & Plan  -In setting of sepsis and acute respiratory failure.  -Improved with antibiotics, fluids, supplemental oxygen.    Acute respiratory failure with hypoxia (HCC)- (present on admission)  Assessment & Plan  -In setting of community-acquired pneumonia.  -See \"RLL pneumonia\" for details.  -Improving.    RLL pneumonia (HCC)- (present on admission)  Assessment & Plan  -does not meet criteria for COVID screening  -Flu negative.  -Likely strep pneumonia given characteristic lobar consolidation on imaging, strep species growing on blood cultures.  -Incentive spirometry, supplemental oxygen as needed, RT per protocol.  -On ceftriaxone for concurrent pneumonia and strep bacteremia (3/8/2020-3/17/2020), stop azithromycin (3/8/2020-3/10/2020).  -Pending sputum culture, CT chest.  -Improving.    Tachycardia- (present on admission)  Assessment & Plan  -Sinus tachycardia in setting of sepsis syndrome and methamphetamine intoxication - last use night prior to presentation to ED 3/8/2020, approximately 10-hour half-life.  Denies other illicit drugs or alcohol use.  -Monitor.    Methamphetamine abuse (HCC)- (present on admission)  Assessment & Plan  -Patient endorses smoking methamphetamine, last use night prior to presentation to ED 3/8/2020.  Denies past IV drug use or alcohol use.  -Likely still intoxicated given approximately 10-hour half-life of methamphetamine, monitor for withdrawal " symptoms.  -Counseled, to be continued as outpatient.    Leukocytosis- (present on admission)  Assessment & Plan  -In setting of sepsis, likely dehydration.  -Antibiotics, encourage oral intake.  -Monitor.      DISPO: Transfer to telemetry    Code Status: Full    Quality Measures:  Feeding: Regular  Analgesia: N/A  Sedation: N/A  Thromboprophylaxis: Lovenox  Head of bed: >30 degrees  Ulcer prophylaxis: N/A  Glycemic control: N/A  Bowel care: bowel regimen  Indwelling lines: PIV  Deescalation of antibiotics: Unasyn (started and stopped 3/8/2020), azithromycin (3/8/2020-3/9/2020), ceftriaxone (3/8/2020- 3/17/2020)   Patent

## 2021-06-10 NOTE — ED ADULT NURSE NOTE - CHIEF COMPLAINT
Radiology Procedure Waiver   Name: Dejah Spencer  : 1962  MRN: [id number]    Date:  6/10/21    Time: 9:50 AM EDT    Benefits of immediately proceeding with Radiology exam(s) without pre-testing outweigh the risks or are not indicated as specified below and therefore the following is/are being waived:    [] Pregnancy test   [] Patients LMP on-time and regular.   [] Patient had Tubal Ligation or has other Contraception Device. [] Patient  is Menopausal or Premenarcheal.    [] Patient had Full or Partial Hysterectomy. [] Protocol for Iodine allergy    [] MRI Questionnaire     [x] BUN/Creatinine   [] Patient age w/no hx of renal dysfunction. [] Patient on Dialysis. [] Recent Normal Labs.   Electronically signed by Vignesh Jaquez MD on 6/10/21 at 9:50 AM EDT               Vignesh Jaquez MD  Resident  06/10/21 [id number] The patient is a 81y Male complaining of

## 2021-11-22 NOTE — ED CDU PROVIDER NOTE - GASTROINTESTINAL, MLM
TRANSFER - IN REPORT:     Verbal report received from: CPRU RN . Report consisted of patient's Situation, Background, Assessment and   Recommendations(SBAR). Opportunity for questions and clarification was provided. Assessment completed upon patient's arrival to unit and care assumed. Patient transported with a Registered Nurse. Abdomen soft, non-tender, no rebound, no guarding.

## 2022-09-13 NOTE — H&P PST ADULT - BP NONINVASIVE SYSTOLIC (MM HG)
Follow up with Primary care physician. Monitor for signs/symptoms of infection such as pain, swelling, temp greater than 100.4. Notify MD.
114

## 2022-11-04 ENCOUNTER — EMERGENCY (EMERGENCY)
Facility: HOSPITAL | Age: 87
LOS: 0 days | Discharge: HOME | End: 2022-11-04
Attending: STUDENT IN AN ORGANIZED HEALTH CARE EDUCATION/TRAINING PROGRAM

## 2022-11-04 VITALS
RESPIRATION RATE: 19 BRPM | HEART RATE: 85 BPM | OXYGEN SATURATION: 100 % | DIASTOLIC BLOOD PRESSURE: 66 MMHG | SYSTOLIC BLOOD PRESSURE: 110 MMHG

## 2022-11-04 VITALS
HEART RATE: 77 BPM | WEIGHT: 145.95 LBS | DIASTOLIC BLOOD PRESSURE: 89 MMHG | RESPIRATION RATE: 18 BRPM | HEIGHT: 66 IN | OXYGEN SATURATION: 100 % | TEMPERATURE: 97 F | SYSTOLIC BLOOD PRESSURE: 152 MMHG

## 2022-11-04 DIAGNOSIS — M54.9 DORSALGIA, UNSPECIFIED: ICD-10-CM

## 2022-11-04 DIAGNOSIS — R07.89 OTHER CHEST PAIN: ICD-10-CM

## 2022-11-04 DIAGNOSIS — E11.9 TYPE 2 DIABETES MELLITUS WITHOUT COMPLICATIONS: ICD-10-CM

## 2022-11-04 DIAGNOSIS — E78.5 HYPERLIPIDEMIA, UNSPECIFIED: ICD-10-CM

## 2022-11-04 DIAGNOSIS — Z98.890 OTHER SPECIFIED POSTPROCEDURAL STATES: Chronic | ICD-10-CM

## 2022-11-04 DIAGNOSIS — I10 ESSENTIAL (PRIMARY) HYPERTENSION: ICD-10-CM

## 2022-11-04 DIAGNOSIS — J02.9 ACUTE PHARYNGITIS, UNSPECIFIED: ICD-10-CM

## 2022-11-04 DIAGNOSIS — Z20.822 CONTACT WITH AND (SUSPECTED) EXPOSURE TO COVID-19: ICD-10-CM

## 2022-11-04 DIAGNOSIS — Z90.49 ACQUIRED ABSENCE OF OTHER SPECIFIED PARTS OF DIGESTIVE TRACT: Chronic | ICD-10-CM

## 2022-11-04 DIAGNOSIS — Z79.84 LONG TERM (CURRENT) USE OF ORAL HYPOGLYCEMIC DRUGS: ICD-10-CM

## 2022-11-04 LAB
ALBUMIN SERPL ELPH-MCNC: 3.5 G/DL — SIGNIFICANT CHANGE UP (ref 3.3–5)
ALP SERPL-CCNC: 82 U/L — SIGNIFICANT CHANGE UP (ref 40–120)
ALT FLD-CCNC: 21 U/L — SIGNIFICANT CHANGE UP (ref 12–78)
ANION GAP SERPL CALC-SCNC: 7 MMOL/L — SIGNIFICANT CHANGE UP (ref 5–17)
AST SERPL-CCNC: 16 U/L — SIGNIFICANT CHANGE UP (ref 15–37)
BASOPHILS # BLD AUTO: 0.09 K/UL — SIGNIFICANT CHANGE UP (ref 0–0.2)
BASOPHILS NFR BLD AUTO: 0.6 % — SIGNIFICANT CHANGE UP (ref 0–2)
BILIRUB SERPL-MCNC: 0.4 MG/DL — SIGNIFICANT CHANGE UP (ref 0.2–1.2)
BUN SERPL-MCNC: 35 MG/DL — HIGH (ref 7–23)
CALCIUM SERPL-MCNC: 9.7 MG/DL — SIGNIFICANT CHANGE UP (ref 8.5–10.1)
CHLORIDE SERPL-SCNC: 102 MMOL/L — SIGNIFICANT CHANGE UP (ref 96–108)
CO2 SERPL-SCNC: 29 MMOL/L — SIGNIFICANT CHANGE UP (ref 22–31)
CREAT SERPL-MCNC: 1.51 MG/DL — HIGH (ref 0.5–1.3)
EGFR: 45 ML/MIN/1.73M2 — LOW
EOSINOPHIL # BLD AUTO: 0.36 K/UL — SIGNIFICANT CHANGE UP (ref 0–0.5)
EOSINOPHIL NFR BLD AUTO: 2.4 % — SIGNIFICANT CHANGE UP (ref 0–6)
FLUAV AG NPH QL: SIGNIFICANT CHANGE UP
FLUBV AG NPH QL: SIGNIFICANT CHANGE UP
GLUCOSE SERPL-MCNC: 126 MG/DL — HIGH (ref 70–99)
HCT VFR BLD CALC: 43 % — SIGNIFICANT CHANGE UP (ref 39–50)
HGB BLD-MCNC: 13.9 G/DL — SIGNIFICANT CHANGE UP (ref 13–17)
IMM GRANULOCYTES NFR BLD AUTO: 0.6 % — SIGNIFICANT CHANGE UP (ref 0–0.9)
LIDOCAIN IGE QN: 208 U/L — SIGNIFICANT CHANGE UP (ref 73–393)
LYMPHOCYTES # BLD AUTO: 1.35 K/UL — SIGNIFICANT CHANGE UP (ref 1–3.3)
LYMPHOCYTES # BLD AUTO: 9.1 % — LOW (ref 13–44)
MCHC RBC-ENTMCNC: 31.1 PG — SIGNIFICANT CHANGE UP (ref 27–34)
MCHC RBC-ENTMCNC: 32.3 G/DL — SIGNIFICANT CHANGE UP (ref 32–36)
MCV RBC AUTO: 96.2 FL — SIGNIFICANT CHANGE UP (ref 80–100)
MONOCYTES # BLD AUTO: 1.08 K/UL — HIGH (ref 0–0.9)
MONOCYTES NFR BLD AUTO: 7.3 % — SIGNIFICANT CHANGE UP (ref 2–14)
NEUTROPHILS # BLD AUTO: 11.83 K/UL — HIGH (ref 1.8–7.4)
NEUTROPHILS NFR BLD AUTO: 80 % — HIGH (ref 43–77)
NRBC # BLD: 0 /100 WBCS — SIGNIFICANT CHANGE UP (ref 0–0)
PLATELET # BLD AUTO: 220 K/UL — SIGNIFICANT CHANGE UP (ref 150–400)
POTASSIUM SERPL-MCNC: 4.7 MMOL/L — SIGNIFICANT CHANGE UP (ref 3.5–5.3)
POTASSIUM SERPL-SCNC: 4.7 MMOL/L — SIGNIFICANT CHANGE UP (ref 3.5–5.3)
PROT SERPL-MCNC: 7.8 GM/DL — SIGNIFICANT CHANGE UP (ref 6–8.3)
RBC # BLD: 4.47 M/UL — SIGNIFICANT CHANGE UP (ref 4.2–5.8)
RBC # FLD: 12.5 % — SIGNIFICANT CHANGE UP (ref 10.3–14.5)
SARS-COV-2 RNA SPEC QL NAA+PROBE: SIGNIFICANT CHANGE UP
SODIUM SERPL-SCNC: 138 MMOL/L — SIGNIFICANT CHANGE UP (ref 135–145)
TROPONIN I, HIGH SENSITIVITY RESULT: 4.7 NG/L — SIGNIFICANT CHANGE UP
WBC # BLD: 14.8 K/UL — HIGH (ref 3.8–10.5)
WBC # FLD AUTO: 14.8 K/UL — HIGH (ref 3.8–10.5)

## 2022-11-04 PROCEDURE — 99285 EMERGENCY DEPT VISIT HI MDM: CPT

## 2022-11-04 PROCEDURE — 71046 X-RAY EXAM CHEST 2 VIEWS: CPT | Mod: 26

## 2022-11-04 PROCEDURE — 93010 ELECTROCARDIOGRAM REPORT: CPT

## 2022-11-04 RX ORDER — IBUPROFEN 200 MG
400 TABLET ORAL ONCE
Refills: 0 | Status: COMPLETED | OUTPATIENT
Start: 2022-11-04 | End: 2022-11-04

## 2022-11-04 RX ORDER — ACETAMINOPHEN 500 MG
975 TABLET ORAL ONCE
Refills: 0 | Status: COMPLETED | OUTPATIENT
Start: 2022-11-04 | End: 2022-11-04

## 2022-11-04 RX ADMIN — Medication 975 MILLIGRAM(S): at 08:03

## 2022-11-04 RX ADMIN — Medication 400 MILLIGRAM(S): at 08:03

## 2022-11-04 NOTE — ED PROVIDER NOTE - PATIENT PORTAL LINK FT
You can access the FollowMyHealth Patient Portal offered by [facility] by registering at the following website: [email or web address]. By joining [employer]’s FollowMyHealth portal, you will also be able to view your health information using other applications (apps) compatible with our system.

## 2022-11-04 NOTE — ED ADULT NURSE NOTE - NSICDXPASTMEDICALHX_GEN_ALL_CORE_FT
PAST MEDICAL HISTORY:  BPH (benign prostatic hyperplasia)     Diabetes mellitus     Hypertension

## 2022-11-04 NOTE — ED PROVIDER NOTE - OBJECTIVE STATEMENT
85yo male with pmh htn, bph, dm, hld, presenting with sore throat, chest pain, back pain.  Started last night and kept him up from sleep.  Some associated sob/ pain with swallowing.  Tolerating po.  Chest pain achy, non exertional.  No numbness, weakness, trauma.  Back pain diffuse, achy.  No urinary symptoms, fever, sick contacts, abd pain, vomiting.

## 2022-11-04 NOTE — ED PROVIDER NOTE - PHYSICAL EXAMINATION
General appearance: Nontoxic appearing, conversant, afebrile    Eyes: anicteric sclerae, KYLEE, EOMI   HENT: Atraumatic; oropharynx clear, MMM and no ulcerations, no pharyngeal erythema or exudate, uvula midline, + b/l submandibular small ttp lad   Neck: Trachea midline; Full range of motion, supple   Pulm: CTA bl, normal respiratory effort and no intercostal retractions, normal work of breathing   CV: RRR, No murmurs, rubs, or gallops   Abdomen: Soft, non-tender, non-distended; no guarding or rebound   Back: No midline ttp, step offs, deformities, no cvat    Extremities: No peripheral edema, no gross deformities, FROM x4, 5/5 MSx4   Skin: Dry, normal temperature, turgor and texture; no rash   Psych: Appropriate affect, cooperative

## 2022-11-04 NOTE — ED PROVIDER NOTE - PROGRESS NOTE DETAILS
Foster: Symptomatically improved and denies complaints at this time.  Labs non actionable and reviewed with patient and son at bedside.  Tolerating po.  Will dc.

## 2022-11-04 NOTE — ED ADULT NURSE NOTE - OBJECTIVE STATEMENT
pt presents to ed a&ox3 c/o chest pain and troat pain and sob stared 4 hours ago , smoker x 50 years. Denies coughing , fever or chills [place] speaking unable to get a full history

## 2022-11-04 NOTE — ED ADULT NURSE NOTE - NSIMPLEMENTINTERV_GEN_ALL_ED
Implemented All Fall Risk Interventions:  [place] to call system. Call bell, personal items and telephone within reach. Instruct patient to call for assistance. Room bathroom lighting operational. Non-slip footwear when patient is off stretcher. Physically safe environment: no spills, clutter or unnecessary equipment. Stretcher in lowest position, wheels locked, appropriate side rails in place. Provide visual cue, wrist band, yellow gown, etc. Monitor gait and stability. Monitor for mental status changes and reorient to person, place, and time. Review medications for side effects contributing to fall risk. Reinforce activity limits and safety measures with patient and family.

## 2022-11-04 NOTE — ED PROVIDER NOTE - CLINICAL SUMMARY MEDICAL DECISION MAKING FREE TEXT BOX
Presenting with sore throat, cp, bp.  Well appearing, throat clear, phonating well and tolerating secretions with no masses, swelling.  CP atypical and ecg unremarkable.  Doubt acs/ acute cardiac/ aortic pathology.  Plan for labs, XR, meds, reassess.

## 2022-11-04 NOTE — ED PROVIDER NOTE - NSFOLLOWUPINSTRUCTIONS_ED_ALL_ED_FT
Pt present to clinic today for a rash, she states it started on Saturday on her right buttock. She has been using topical benadryl with no relief.  Ashley Edwards LPN on 7/23/2018 at 7:27 PM    
Rest, drink plenty of fluids  Advance activity as tolerated  Continue all previously prescribed medications as directed  Follow up with your PMD - bring copies of your results  Return to the ER for chest pain, difficulty breathing, or other new or concerning symptoms   For pain, you may take Tylenol 975mg every six hours and supplement with ibuprofen 400mg, with food, every six hours which can be taken three hours apart from the Tylenol to have a layered effect.

## 2023-11-26 PROBLEM — K46.9 UNSPECIFIED ABDOMINAL HERNIA WITHOUT OBSTRUCTION OR GANGRENE: Chronic | Status: ACTIVE | Noted: 2017-07-27

## 2023-11-26 PROBLEM — E11.9 TYPE 2 DIABETES MELLITUS WITHOUT COMPLICATIONS: Chronic | Status: ACTIVE | Noted: 2017-06-20

## 2023-11-26 PROBLEM — K46.9 UNSPECIFIED ABDOMINAL HERNIA WITHOUT OBSTRUCTION OR GANGRENE: Chronic | Status: ACTIVE | Noted: 2017-06-20

## 2023-11-26 PROBLEM — Z86.79 PERSONAL HISTORY OF OTHER DISEASES OF THE CIRCULATORY SYSTEM: Chronic | Status: ACTIVE | Noted: 2017-07-27

## 2023-11-26 PROBLEM — E78.5 HYPERLIPIDEMIA, UNSPECIFIED: Chronic | Status: ACTIVE | Noted: 2017-07-27

## 2023-11-26 PROBLEM — E11.9 TYPE 2 DIABETES MELLITUS WITHOUT COMPLICATIONS: Chronic | Status: ACTIVE | Noted: 2017-07-27

## 2023-11-26 PROBLEM — K21.9 GASTRO-ESOPHAGEAL REFLUX DISEASE WITHOUT ESOPHAGITIS: Chronic | Status: ACTIVE | Noted: 2017-07-27

## 2023-11-26 PROBLEM — I10 ESSENTIAL (PRIMARY) HYPERTENSION: Chronic | Status: ACTIVE | Noted: 2017-06-20

## 2023-11-26 RX ORDER — AMLODIPINE BESYLATE 2.5 MG/1
1 TABLET ORAL
Qty: 0 | Refills: 0 | DISCHARGE

## 2023-11-26 RX ORDER — RANITIDINE HYDROCHLORIDE 150 MG/1
0 TABLET, FILM COATED ORAL
Qty: 0 | Refills: 0 | DISCHARGE

## 2023-11-26 RX ORDER — TAMSULOSIN HYDROCHLORIDE 0.4 MG/1
1 CAPSULE ORAL
Qty: 0 | Refills: 0 | DISCHARGE

## 2023-11-26 RX ORDER — METFORMIN HYDROCHLORIDE 850 MG/1
1 TABLET ORAL
Qty: 0 | Refills: 0 | DISCHARGE
